# Patient Record
Sex: MALE | Race: OTHER | NOT HISPANIC OR LATINO | URBAN - METROPOLITAN AREA
[De-identification: names, ages, dates, MRNs, and addresses within clinical notes are randomized per-mention and may not be internally consistent; named-entity substitution may affect disease eponyms.]

---
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
## 2023-01-11 ENCOUNTER — APPOINTMENT (OUTPATIENT)
Dept: PEDIATRIC PULMONARY CYSTIC FIB | Facility: CLINIC | Age: 18
End: 2023-01-11
Payer: COMMERCIAL

## 2023-01-11 VITALS
HEIGHT: 70.87 IN | HEART RATE: 108 BPM | WEIGHT: 202.7 LBS | BODY MASS INDEX: 28.38 KG/M2 | SYSTOLIC BLOOD PRESSURE: 110 MMHG | OXYGEN SATURATION: 98 % | DIASTOLIC BLOOD PRESSURE: 74 MMHG

## 2023-01-11 PROCEDURE — 95012 NITRIC OXIDE EXP GAS DETER: CPT

## 2023-01-11 PROCEDURE — 99214 OFFICE O/P EST MOD 30 MIN: CPT | Mod: 25

## 2023-01-11 NOTE — HISTORY OF PRESENT ILLNESS
[FreeTextEntry1] : This 17-year-old returns for a follow-up visit.  \par He had been taking Arnuity Ellipta 200 mcg a puff, 1 puff daily.  \par \par He was not nasally congested.  He occasionally coughs at night, probably nights that he exercises without remembering to take albuterol prior to activity.    He usually takes albuterol prior to activity and was  tolerating activity well.\par \par He does not snack but eats large meals.  His weight is increased 3 kg since August 2022 with increasing BMI.\par \par He drinks  2 cups of 1% organic milk a day.\par \par Sleep: He was no longer snoring at night.\par \par \par  He had not experienced chest tightness.    \par \par Respiratory allergy panel by the [employer] showed multiple positive reactions.\par He was 3+ to cat, 2+ to dog, 2+ ragweed, borderline Bermuda grass, Louie grass, mouse, maple/box elder. IgE 47. 25 hydroxy vitamin D level 31 ng per mL. Chest x-ray negative.\par \par \par PAST MEDICAL HISTORY:\par History of developing a persistent cough when he would catch a cold. This had been ongoing  between 10 and 14 years of age.  He would have 4-6 flare-ups a year. With flare-ups, he would develop shortness of breath with activity. He developed a persistent cough mid-January 2019 that resolved once he was placed on an inhaled corticosteroid.\par \par Hospitalizations: Never\par \par Emergency room visits: Once at a month of age with fever. He had a sepsis workup performed at that time.\par \par Surgery: He has never been operated on.

## 2023-01-11 NOTE — ASSESSMENT
[FreeTextEntry1] : Impression: Moderate persistent bronchial asthma, allergic rhinitis, borderline vitamin D levels, he is overweight\par Moderate persistent bronchial asthma :  Albuterol is to be taken prior to activity and every 4 hours as needed.  Results of exhaled nitric oxide testing discussed.  Arnuity Ellipta was prescribed 200 mcg a puff, 1 puff daily.  He had received the Covid vaccine.  He has decided against the influenza vaccine.  If he continues to do well, I plan to decrease the strength of his Arnuity 200 mcg a puff.\par Allergic rhinitis: Environmental allergen control measures have been suggested.   Claritin is to be used as needed.\par \par Borderline vitamin D levels: He is drinking 2 cups of milk a day.   \par .\par Mildly overweight: Food choices were discussed with both Mikhail and mother.  I encouraged him to continue to increase his activity level and decrease portion size.\par Over 50% of time was spent in counseling. I asked mother to bring him back for a follow-up visit in 4 months.

## 2023-01-11 NOTE — PHYSICAL EXAM
[Well Nourished] : well nourished [Well Developed] : well developed [Alert] : ~L alert [Active] : active [Tympanic Membranes Clear] : tympanic membranes were clear [No Nasal Drainage] : no nasal drainage [No Polyps] : no polyps [No Sinus Tenderness] : no sinus tenderness [No Oral Pallor] : no oral pallor [No Oral Cyanosis] : no oral cyanosis [Non-Erythematous] : non-erythematous [No Postnasal Drip] : no postnasal drip [Tonsil Size ___] : tonsil size [unfilled] [No Stridor] : no stridor [Absence Of Retractions] : absence of retractions [Symmetric] : symmetric [Good Expansion] : good expansion [No Acc Muscle Use] : no accessory muscle use [Good aeration to bases] : good aeration to bases [Equal Breath Sounds] : equal breath sounds bilaterally [No Crackles] : no crackles [No Rhonchi] : no rhonchi [No Wheezing] : no wheezing [Normal Sinus Rhythm] : normal sinus rhythm [Soft, Non-Tender] : soft, non-tender [No Hepatosplenomegaly] : no hepatosplenomegaly [Non Distended] : was not ~L distended [Abdomen Mass (___ Cm)] : no abdominal mass palpated [Abdomen Hernia] : no hernia was discovered [Full ROM] : full range of motion [No Clubbing] : no clubbing [Capillary Refill < 2 secs] : capillary refill less than two seconds [No Cyanosis] : no cyanosis [No Petechiae] : no petechiae [No Kyphoscoliosis] : no kyphoscoliosis [No Contractures] : no contractures [Abnormal Walk] : normal gait [Alert and  Oriented] : alert and oriented [No Abnormal Focal Findings] : no abnormal focal findings [Normal Muscle Tone And Reflexes] : normal muscle tone and reflexes [No Birth Marks] : no birth marks [No Skin Ulcers] : no skin ulcers [FreeTextEntry1] : Overweight  [FreeTextEntry2] : Allergic shiners [FreeTextEntry4] : Nasal mucous membranes raegan [de-identified] : mild acne face.  Scar dorsum of hand secondary to an injury.

## 2023-01-11 NOTE — CONSULT LETTER
[Dear  ___] : Dear  [unfilled], [Consult Letter:] : I had the pleasure of evaluating your patient, [unfilled]. [Please see my note below.] : Please see my note below. [Consult Closing:] : Thank you very much for allowing me to participate in the care of this patient.  If you have any questions, please do not hesitate to contact me. [Sincerely,] : Sincerely, [FreeTextEntry3] : Neville Ortega MD\par Pediatric Pulmonology and Sleep Medicine\par Director Pediatric Asthma Center\par , Pediatric Sleep Disorders,\par  of Pediatrics, [facility] at [facility],\par [address] [place] [address]\[place] [address]\par (P)[phone]\par (P) [phone]\par (F) [phone] \par \par

## 2023-01-11 NOTE — REVIEW OF SYSTEMS
[Nl] : Endocrine [Cough] : cough [Rash] : rash [Allergy Shiners] : allergy shiners [Frequent URIs] : no frequent upper respiratory infections [Snoring] : no snoring [Apnea] : no apnea [Restlessness] : no restlessness [Daytime Sleepiness] : no daytime sleepiness [Daytime Hyperactivity] : no daytime hyperactivity [Voice Changes] : no voice changes [Frequent Croup] : no frequent croup [Chronic Hoarseness] : no chronic hoarseness [Rhinorrhea] : no rhinorrhea [Nasal Congestion] : no nasal congestion [Postnasl Drip] : no postnasal drip [Epistaxis] : no epistaxis [Tinnitus] : no tinnitus [Tachypnea] : not tachypneic [Wheezing] : no wheezing [Shortness of Breath] : no shortness of breath [Bronchitis] : no bronchitis [Pneumonia] : no pneumonia [Hemoptysis] : no hemoptysis [Nocturia] : no nocturia [Urgency] : no feelings of urinary urgency [Frequency] : no urinary frequency [Dysuria] : no dysuria [Birth Marks] : no birth marks [Eczema] : no ezcema [Urticaria] : no urticaria [Laryngeal Edema] : no laryngeal edema [Immunocompromised] : not immunocompromised [de-identified] : acne face

## 2023-03-30 ENCOUNTER — APPOINTMENT (OUTPATIENT)
Dept: ORTHOPEDIC SURGERY | Facility: CLINIC | Age: 18
End: 2023-03-30
Payer: COMMERCIAL

## 2023-03-30 VITALS — WEIGHT: 202 LBS | HEIGHT: 70 IN | BODY MASS INDEX: 28.92 KG/M2

## 2023-03-30 PROCEDURE — 99202 OFFICE O/P NEW SF 15 MIN: CPT

## 2023-03-30 NOTE — DATA REVIEWED
[FreeTextEntry1] : I reviewed his radiographs from initial film which shows a proximal pole of scaphoid fracture that does appear to be nondisplaced.  MRI confirms this.  Then more recent radiographs show not much of a cystic change there could be some displacement of the fracture proximal pole

## 2023-03-30 NOTE — PHYSICAL EXAM
[de-identified] : Patient is tender to palpation around the wrist area.  The radial side of the wrist he has normal sensation normal capillary refill no erythema ecchymoses or abrasions.

## 2023-03-30 NOTE — HISTORY OF PRESENT ILLNESS
[de-identified] : 18-year-old male fell from a height resulting in injury to his left wrist.  His injury occurred on January 4.  He did not seek care at that time eventually got radiographs done about a month later of his left wrist there was a question of a scaphoid injury at that time MRI was ordered was done about a month later he had recent radiographs done just the other day and he comes in for the evaluation today.  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

## 2023-03-30 NOTE — ASSESSMENT
[FreeTextEntry1] : Patient has a left wrist proximal pole scaphoid fracture.  3 months since injury.  CAT scan is necessary to evaluate for the possibility of nonunion displacement and healing he is going to get this study done.  It is important that this study get done quickly.  Once he gets the study done we will contact the office for evaluation stability of surgical intervention with bone graft was discussed

## 2023-04-27 ENCOUNTER — NON-APPOINTMENT (OUTPATIENT)
Age: 18
End: 2023-04-27

## 2023-05-05 ENCOUNTER — OUTPATIENT (OUTPATIENT)
Dept: OUTPATIENT SERVICES | Facility: HOSPITAL | Age: 18
LOS: 1 days | End: 2023-05-05
Payer: COMMERCIAL

## 2023-05-05 VITALS
OXYGEN SATURATION: 98 % | HEART RATE: 70 BPM | TEMPERATURE: 98 F | WEIGHT: 205.03 LBS | RESPIRATION RATE: 16 BRPM | SYSTOLIC BLOOD PRESSURE: 120 MMHG | DIASTOLIC BLOOD PRESSURE: 84 MMHG | HEIGHT: 72 IN

## 2023-05-05 DIAGNOSIS — S62.032K: ICD-10-CM

## 2023-05-05 DIAGNOSIS — Z01.818 ENCOUNTER FOR OTHER PREPROCEDURAL EXAMINATION: ICD-10-CM

## 2023-05-05 LAB
ALBUMIN SERPL ELPH-MCNC: 4.9 G/DL — SIGNIFICANT CHANGE UP (ref 3.5–5.2)
ALP SERPL-CCNC: 73 U/L — SIGNIFICANT CHANGE UP (ref 30–115)
ALT FLD-CCNC: 22 U/L — SIGNIFICANT CHANGE UP (ref 13–38)
ANION GAP SERPL CALC-SCNC: 10 MMOL/L — SIGNIFICANT CHANGE UP (ref 7–14)
APPEARANCE UR: CLEAR — SIGNIFICANT CHANGE UP
AST SERPL-CCNC: 23 U/L — SIGNIFICANT CHANGE UP (ref 13–38)
BASOPHILS # BLD AUTO: 0.03 K/UL — SIGNIFICANT CHANGE UP (ref 0–0.2)
BASOPHILS NFR BLD AUTO: 0.4 % — SIGNIFICANT CHANGE UP (ref 0–1)
BILIRUB SERPL-MCNC: 1.6 MG/DL — HIGH (ref 0.2–1.2)
BILIRUB UR-MCNC: NEGATIVE — SIGNIFICANT CHANGE UP
BUN SERPL-MCNC: 16 MG/DL — SIGNIFICANT CHANGE UP (ref 10–20)
CALCIUM SERPL-MCNC: 9 MG/DL — SIGNIFICANT CHANGE UP (ref 8.4–10.5)
CHLORIDE SERPL-SCNC: 102 MMOL/L — SIGNIFICANT CHANGE UP (ref 98–110)
CO2 SERPL-SCNC: 29 MMOL/L — SIGNIFICANT CHANGE UP (ref 17–32)
COLOR SPEC: COLORLESS — SIGNIFICANT CHANGE UP
CREAT SERPL-MCNC: 1.2 MG/DL — SIGNIFICANT CHANGE UP (ref 0.7–1.5)
DIFF PNL FLD: NEGATIVE — SIGNIFICANT CHANGE UP
EGFR: 89 ML/MIN/1.73M2 — SIGNIFICANT CHANGE UP
EOSINOPHIL # BLD AUTO: 0.12 K/UL — SIGNIFICANT CHANGE UP (ref 0–0.7)
EOSINOPHIL NFR BLD AUTO: 1.7 % — SIGNIFICANT CHANGE UP (ref 0–8)
GLUCOSE SERPL-MCNC: 83 MG/DL — SIGNIFICANT CHANGE UP (ref 70–99)
GLUCOSE UR QL: NEGATIVE — SIGNIFICANT CHANGE UP
HCT VFR BLD CALC: 45 % — SIGNIFICANT CHANGE UP (ref 42–52)
HGB BLD-MCNC: 15.1 G/DL — SIGNIFICANT CHANGE UP (ref 14–18)
IMM GRANULOCYTES NFR BLD AUTO: 0.3 % — SIGNIFICANT CHANGE UP (ref 0.1–0.3)
KETONES UR-MCNC: NEGATIVE — SIGNIFICANT CHANGE UP
LEUKOCYTE ESTERASE UR-ACNC: NEGATIVE — SIGNIFICANT CHANGE UP
LYMPHOCYTES # BLD AUTO: 2.01 K/UL — SIGNIFICANT CHANGE UP (ref 1.2–3.4)
LYMPHOCYTES # BLD AUTO: 29.1 % — SIGNIFICANT CHANGE UP (ref 20.5–51.1)
MCHC RBC-ENTMCNC: 30.3 PG — SIGNIFICANT CHANGE UP (ref 27–31)
MCHC RBC-ENTMCNC: 33.6 G/DL — SIGNIFICANT CHANGE UP (ref 32–37)
MCV RBC AUTO: 90.4 FL — SIGNIFICANT CHANGE UP (ref 80–94)
MONOCYTES # BLD AUTO: 0.65 K/UL — HIGH (ref 0.1–0.6)
MONOCYTES NFR BLD AUTO: 9.4 % — HIGH (ref 1.7–9.3)
NEUTROPHILS # BLD AUTO: 4.08 K/UL — SIGNIFICANT CHANGE UP (ref 1.4–6.5)
NEUTROPHILS NFR BLD AUTO: 59.1 % — SIGNIFICANT CHANGE UP (ref 42.2–75.2)
NITRITE UR-MCNC: NEGATIVE — SIGNIFICANT CHANGE UP
NRBC # BLD: 0 /100 WBCS — SIGNIFICANT CHANGE UP (ref 0–0)
PH UR: 6.5 — SIGNIFICANT CHANGE UP (ref 5–8)
PLATELET # BLD AUTO: 305 K/UL — SIGNIFICANT CHANGE UP (ref 130–400)
PMV BLD: 8.8 FL — SIGNIFICANT CHANGE UP (ref 7.4–10.4)
POTASSIUM SERPL-MCNC: 4.3 MMOL/L — SIGNIFICANT CHANGE UP (ref 3.5–5)
POTASSIUM SERPL-SCNC: 4.3 MMOL/L — SIGNIFICANT CHANGE UP (ref 3.5–5)
PROT SERPL-MCNC: 6.9 G/DL — SIGNIFICANT CHANGE UP (ref 6–8)
PROT UR-MCNC: NEGATIVE — SIGNIFICANT CHANGE UP
RBC # BLD: 4.98 M/UL — SIGNIFICANT CHANGE UP (ref 4.7–6.1)
RBC # FLD: 11.8 % — SIGNIFICANT CHANGE UP (ref 11.5–14.5)
SODIUM SERPL-SCNC: 141 MMOL/L — SIGNIFICANT CHANGE UP (ref 135–146)
SP GR SPEC: 1.01 — LOW (ref 1.01–1.03)
UROBILINOGEN FLD QL: SIGNIFICANT CHANGE UP
WBC # BLD: 6.91 K/UL — SIGNIFICANT CHANGE UP (ref 4.8–10.8)
WBC # FLD AUTO: 6.91 K/UL — SIGNIFICANT CHANGE UP (ref 4.8–10.8)

## 2023-05-05 PROCEDURE — 36415 COLL VENOUS BLD VENIPUNCTURE: CPT

## 2023-05-05 PROCEDURE — 80053 COMPREHEN METABOLIC PANEL: CPT

## 2023-05-05 PROCEDURE — 81003 URINALYSIS AUTO W/O SCOPE: CPT

## 2023-05-05 PROCEDURE — 99214 OFFICE O/P EST MOD 30 MIN: CPT | Mod: 25

## 2023-05-05 PROCEDURE — 85025 COMPLETE CBC W/AUTO DIFF WBC: CPT

## 2023-05-05 NOTE — H&P PST ADULT - NSANTHOSAYNRD_GEN_A_CORE
No. LYNNE screening performed.  STOP BANG Legend: 0-2 = LOW Risk; 3-4 = INTERMEDIATE Risk; 5-8 = HIGH Risk

## 2023-05-05 NOTE — H&P PST ADULT - HISTORY OF PRESENT ILLNESS
18yr old male states fell off his truck at home in 1/2023 and " I broke the fall with my LT hand and broke my scaphoid" -reports inability to lift wts and do exercises and pain with certain movements -is now scheduled for OPEN REDUCTION INTERNAL FIXATION LEFT SCAPHOID. Denies COVID S/S. Recd 2 doses vaccine. Verbalized understanding of COVID prevention measures. Exercise radha 3-4 FOS. Exercise induced asthma- triggers - intense cardio -last use Albuterol 1/2023  Anesthesia Alert  NO--Difficult Airway  NO--History of neck surgery or radiation  NO--Limited ROM of neck  NO--History of Malignant hyperthermia  NO--Personal or family history of Pseudocholinesterase deficiency  NO--Prior Anesthesia Complication  NO--Latex Allergy  NO--Loose teeth  NO--History of Rheumatoid Arthritis  NO--LYNNE  No Bleeding risk  NO--Other_____

## 2023-05-06 DIAGNOSIS — S62.032K: ICD-10-CM

## 2023-05-06 DIAGNOSIS — Z01.818 ENCOUNTER FOR OTHER PREPROCEDURAL EXAMINATION: ICD-10-CM

## 2023-05-09 ENCOUNTER — APPOINTMENT (OUTPATIENT)
Dept: ORTHOPEDIC SURGERY | Facility: CLINIC | Age: 18
End: 2023-05-09

## 2023-05-09 NOTE — ASU PATIENT PROFILE, ADULT - FALL HARM RISK - UNIVERSAL INTERVENTIONS
Bed in lowest position, wheels locked, appropriate side rails in place/Call bell, personal items and telephone in reach/Instruct patient to call for assistance before getting out of bed or chair/Non-slip footwear when patient is out of bed/[place] to call system/Physically safe environment - no spills, clutter or unnecessary equipment/Purposeful Proactive Rounding/Room/bathroom lighting operational, light cord in reach

## 2023-05-09 NOTE — ASU PATIENT PROFILE, ADULT - NSSUBSTANCEUSE_GEN_ALL_CORE_SD
[de-identified] : Patient is well nourished, well-developed, in no acute distress, with appropriate mood and affect. The patient is oriented to time, place, and person. Respirations are even and unlabored. Examination reveals satisfactory wound healing. No surrounding erythema. Motion is good and relatively pain free. Leg lengths are acceptable.\par \par  [de-identified] : AP pelvis, AP hip, and lateral x-rays of the left hip were ordered and obtained in the office and demonstrate satisfactory position and alignment of the components are present. No signs of loosening are seen. never used

## 2023-05-10 ENCOUNTER — APPOINTMENT (OUTPATIENT)
Dept: ORTHOPEDIC SURGERY | Facility: AMBULATORY SURGERY CENTER | Age: 18
End: 2023-05-10

## 2023-05-10 ENCOUNTER — TRANSCRIPTION ENCOUNTER (OUTPATIENT)
Age: 18
End: 2023-05-10

## 2023-05-10 ENCOUNTER — OUTPATIENT (OUTPATIENT)
Dept: INPATIENT UNIT | Facility: HOSPITAL | Age: 18
LOS: 1 days | Discharge: HOME | End: 2023-05-10
Payer: COMMERCIAL

## 2023-05-10 VITALS
TEMPERATURE: 98 F | RESPIRATION RATE: 18 BRPM | WEIGHT: 205.03 LBS | HEART RATE: 62 BPM | OXYGEN SATURATION: 100 % | SYSTOLIC BLOOD PRESSURE: 116 MMHG | DIASTOLIC BLOOD PRESSURE: 57 MMHG | HEIGHT: 72 IN

## 2023-05-10 VITALS
SYSTOLIC BLOOD PRESSURE: 119 MMHG | TEMPERATURE: 98 F | RESPIRATION RATE: 12 BRPM | HEART RATE: 66 BPM | DIASTOLIC BLOOD PRESSURE: 55 MMHG | OXYGEN SATURATION: 95 %

## 2023-05-10 DIAGNOSIS — S62.032K: ICD-10-CM

## 2023-05-10 PROCEDURE — 25628 OPTX CARPL SCPHD FX INT FIXJ: CPT | Mod: LT

## 2023-05-10 PROCEDURE — C1713: CPT

## 2023-05-10 RX ORDER — ONDANSETRON 8 MG/1
4 TABLET, FILM COATED ORAL ONCE
Refills: 0 | Status: DISCONTINUED | OUTPATIENT
Start: 2023-05-10 | End: 2023-05-10

## 2023-05-10 RX ORDER — IBUPROFEN 200 MG
1 TABLET ORAL
Qty: 20 | Refills: 0
Start: 2023-05-10

## 2023-05-10 RX ORDER — FLUTICASONE PROPIONATE 220 MCG
1 AEROSOL WITH ADAPTER (GRAM) INHALATION
Refills: 0 | DISCHARGE

## 2023-05-10 RX ORDER — OXYCODONE AND ACETAMINOPHEN 5; 325 MG/1; MG/1
1 TABLET ORAL
Qty: 15 | Refills: 0
Start: 2023-05-10 | End: 2023-05-14

## 2023-05-10 RX ORDER — SODIUM CHLORIDE 9 MG/ML
1000 INJECTION, SOLUTION INTRAVENOUS
Refills: 0 | Status: DISCONTINUED | OUTPATIENT
Start: 2023-05-10 | End: 2023-05-10

## 2023-05-10 RX ORDER — OXYCODONE HYDROCHLORIDE 5 MG/1
5 TABLET ORAL ONCE
Refills: 0 | Status: DISCONTINUED | OUTPATIENT
Start: 2023-05-10 | End: 2023-05-10

## 2023-05-10 RX ORDER — HYDROMORPHONE HYDROCHLORIDE 2 MG/ML
0.5 INJECTION INTRAMUSCULAR; INTRAVENOUS; SUBCUTANEOUS
Refills: 0 | Status: DISCONTINUED | OUTPATIENT
Start: 2023-05-10 | End: 2023-05-10

## 2023-05-10 NOTE — BRIEF OPERATIVE NOTE - NSICDXBRIEFPOSTOP_GEN_ALL_CORE_FT
POST-OP DIAGNOSIS:  Nondisplaced fracture of proximal third of scaphoid bone of left wrist 10-May-2023 10:27:22  Hardy Abarca

## 2023-05-10 NOTE — PRE-ANESTHESIA EVALUATION ADULT - NSANTHADDINFOFT_GEN_ALL_CORE
Discussed benefits of peripheral nerve block including block failure, bleeding, infection and nerve damage.  Patient expressed understanding of these risks, signed informed consent and wishes to proceed with block.  primary regional anesthesia with sedation for patient comfort. back up general anesthesia.

## 2023-05-10 NOTE — BRIEF OPERATIVE NOTE - NSICDXBRIEFPREOP_GEN_ALL_CORE_FT
PRE-OP DIAGNOSIS:  Nondisplaced fracture of proximal third of scaphoid bone of left wrist 10-May-2023 10:25:36  Hardy Abarca

## 2023-05-15 DIAGNOSIS — J45.909 UNSPECIFIED ASTHMA, UNCOMPLICATED: ICD-10-CM

## 2023-05-15 DIAGNOSIS — S62.035A: ICD-10-CM
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
## 2023-05-18 ENCOUNTER — APPOINTMENT (OUTPATIENT)
Dept: ORTHOPEDIC SURGERY | Facility: CLINIC | Age: 18
End: 2023-05-18
Payer: COMMERCIAL

## 2023-05-18 PROBLEM — J45.909 UNSPECIFIED ASTHMA, UNCOMPLICATED: Chronic | Status: ACTIVE | Noted: 2023-05-05

## 2023-05-18 PROBLEM — Z86.19 PERSONAL HISTORY OF OTHER INFECTIOUS AND PARASITIC DISEASES: Chronic | Status: ACTIVE | Noted: 2023-05-05

## 2023-05-18 PROCEDURE — 29075 APPL CST ELBW FNGR SHORT ARM: CPT

## 2023-05-18 PROCEDURE — 99024 POSTOP FOLLOW-UP VISIT: CPT

## 2023-05-18 PROCEDURE — 73110 X-RAY EXAM OF WRIST: CPT | Mod: LT

## 2023-05-18 NOTE — PHYSICAL EXAM
[de-identified] : Postop bandages were removed.  On examination of his left wrist there is no swelling or ecchymosis.  The incision is healing well, there is no surrounding erythema or drainage from the wound.  He has good range of motion of the hand and wrist, sensation is intact throughout, 2+ radial pulse.\par \par X-rays taken in the office today of the left wrist show good alignment of the fracture, hardware is in place.

## 2023-05-18 NOTE — HISTORY OF PRESENT ILLNESS
[de-identified] : 18-year-old male is here today for his first postop appointment.  He is status post left scaphoid ORIF 5/10/2023 with Dr. Abarca.  He is doing well.

## 2023-05-25 ENCOUNTER — APPOINTMENT (OUTPATIENT)
Dept: PEDIATRIC PULMONARY CYSTIC FIB | Facility: CLINIC | Age: 18
End: 2023-05-25
Payer: COMMERCIAL

## 2023-05-25 VITALS
WEIGHT: 199.5 LBS | DIASTOLIC BLOOD PRESSURE: 74 MMHG | SYSTOLIC BLOOD PRESSURE: 118 MMHG | BODY MASS INDEX: 27.62 KG/M2 | OXYGEN SATURATION: 98 % | HEART RATE: 121 BPM | HEIGHT: 71.26 IN

## 2023-05-25 PROCEDURE — 94010 BREATHING CAPACITY TEST: CPT

## 2023-05-25 PROCEDURE — 95012 NITRIC OXIDE EXP GAS DETER: CPT

## 2023-05-25 PROCEDURE — 99214 OFFICE O/P EST MOD 30 MIN: CPT | Mod: 25

## 2023-05-25 NOTE — PHYSICAL EXAM
[Well Nourished] : well nourished [Well Developed] : well developed [Alert] : ~L alert [Active] : active [Tympanic Membranes Clear] : tympanic membranes were clear [No Nasal Drainage] : no nasal drainage [No Polyps] : no polyps [No Sinus Tenderness] : no sinus tenderness [No Oral Pallor] : no oral pallor [No Oral Cyanosis] : no oral cyanosis [Non-Erythematous] : non-erythematous [No Postnasal Drip] : no postnasal drip [Tonsil Size ___] : tonsil size [unfilled] [No Stridor] : no stridor [Absence Of Retractions] : absence of retractions [Symmetric] : symmetric [Good Expansion] : good expansion [No Acc Muscle Use] : no accessory muscle use [Good aeration to bases] : good aeration to bases [Equal Breath Sounds] : equal breath sounds bilaterally [No Crackles] : no crackles [No Rhonchi] : no rhonchi [No Wheezing] : no wheezing [Normal Sinus Rhythm] : normal sinus rhythm [Soft, Non-Tender] : soft, non-tender [No Hepatosplenomegaly] : no hepatosplenomegaly [Non Distended] : was not ~L distended [Abdomen Mass (___ Cm)] : no abdominal mass palpated [Abdomen Hernia] : no hernia was discovered [Full ROM] : full range of motion [No Clubbing] : no clubbing [Capillary Refill < 2 secs] : capillary refill less than two seconds [No Cyanosis] : no cyanosis [No Petechiae] : no petechiae [No Kyphoscoliosis] : no kyphoscoliosis [No Contractures] : no contractures [Abnormal Walk] : normal gait [Alert and  Oriented] : alert and oriented [No Abnormal Focal Findings] : no abnormal focal findings [Normal Muscle Tone And Reflexes] : normal muscle tone and reflexes [No Birth Marks] : no birth marks [No Skin Ulcers] : no skin ulcers [FreeTextEntry1] : Overweight  [FreeTextEntry2] : Allergic shiners [FreeTextEntry4] : Nasal mucous membranes raegan [de-identified] : Cast left arm. [de-identified] : mild acne face.  Scar dorsum of right hand secondary to an injury.

## 2023-05-25 NOTE — REVIEW OF SYSTEMS
[Nl] : Endocrine [Cough] : cough [Rash] : rash [Allergy Shiners] : allergy shiners [Frequent URIs] : no frequent upper respiratory infections [Snoring] : no snoring [Apnea] : no apnea [Restlessness] : no restlessness [Daytime Sleepiness] : no daytime sleepiness [Daytime Hyperactivity] : no daytime hyperactivity [Voice Changes] : no voice changes [Frequent Croup] : no frequent croup [Chronic Hoarseness] : no chronic hoarseness [Rhinorrhea] : no rhinorrhea [Nasal Congestion] : no nasal congestion [Postnasl Drip] : no postnasal drip [Epistaxis] : no epistaxis [Tinnitus] : no tinnitus [Tachypnea] : not tachypneic [Wheezing] : no wheezing [Shortness of Breath] : no shortness of breath [Bronchitis] : no bronchitis [Pneumonia] : no pneumonia [Hemoptysis] : no hemoptysis [Nocturia] : no nocturia [Urgency] : no feelings of urinary urgency [Frequency] : no urinary frequency [Dysuria] : no dysuria [Joint Pains] : no joint pain [Joint Swelling] : no joint swelling [Raynaud's Phenomenon] : no raynaud's phenomenon [Rib Cage Abnormalities] : no rib cage abnormalities [Myalgia] : no myalgia [Trauma] : trauma [Fracture] : fracture [Clubbing] : no clubbing [Back Pain] : no back pain [Birth Marks] : no birth marks [Eczema] : no ezcema [Urticaria] : no urticaria [Laryngeal Edema] : no laryngeal edema [Immunocompromised] : not immunocompromised [de-identified] : acne face

## 2023-05-25 NOTE — IMPRESSION
[Spirometry] : Spirometry [Normal Spirometry] : spirometry normal [FreeTextEntry1] : Spirometry normal with an FEV1 by FVC of 99% and FEF 25 to 75% 136% predicted.  Exhaled nitric oxide 11.

## 2023-05-25 NOTE — ASSESSMENT
[FreeTextEntry1] : Impression: Moderate persistent bronchial asthma, allergic rhinitis, borderline vitamin D levels, he is overweight\par Moderate persistent bronchial asthma : Results of exhaled nitric oxide testing and spirometry discussed.  Albuterol is to be taken prior to activity and every 4 hours as needed.   Arnuity Ellipta was prescribed 200 mcg a puff, 1 puff daily.  He had received the Covid vaccine.  As he does well in the fall, he is to decrease to Arnuity Ellipta 100 mcg a puff 1 puff daily in September.\par Allergic rhinitis: Environmental allergen control measures have been suggested.   Claritin is to be used as needed.\par \par Borderline vitamin D levels: Encouraged him to consistently drink 2 cups of milk a day.     \par .\par Mildly overweight:  I encouraged him to continue to increase his activity level and decrease portion size.\par Over 50% of time was spent in counseling. I asked mother to bring him back for a follow-up visit in 6 months.\par \par Dictation generated through [facility]. Note not proofed and edited.\par

## 2023-05-25 NOTE — HISTORY OF PRESENT ILLNESS
[FreeTextEntry1] : Irina 89-year-old returns for a follow-up visit.  \par He had been taking Arnuity Ellipta 200 mcg a puff, 1 puff daily.  \par \par He had had cough with increased sputum sputum of 2 days duration.  Taking albuterol.\par He works in a go environment in the summer and tends to be symptomatic during this period.\par He had surgery due to fracture of the left scaphoid.  The injury occurred in January 2023.  Surgery was performed May 2023.\par \par He was not nasally congested.  He does not cough at night. .    He usually takes albuterol prior to activity and was  tolerating activity well.\par \par He does not snack but eats large meals.  \par He drinks 1-  2 cups of 1% organic milk a day.\par \par Sleep: He was no longer snoring at night.\par \par \par  He had not experienced chest tightness.    \par \par Respiratory allergy panel by the [employer] showed multiple positive reactions.\par He was 3+ to cat, 2+ to dog, 2+ ragweed, borderline [place] grass, Louie grass, mouse, maple/box elder. IgE 47. 25 hydroxy vitamin D level 31 ng per mL. Chest x-ray negative.\par \par \par PAST MEDICAL HISTORY:\par History of developing a persistent cough when he would catch a cold. This had been ongoing  between 10 and 14 years of age.  He would have 4-6 flare-ups a year. With flare-ups, he would develop shortness of breath with activity. He developed a persistent cough mid-January 2019 that resolved once he was placed on an inhaled corticosteroid.\par \par Hospitalizations: Never\par \par Emergency room visits: Once at a month of age with fever. He had a sepsis workup performed at that time.\par \par Surgery: Surgery to left scaphoid May 2023.

## 2023-05-25 NOTE — CONSULT LETTER
[Dear  ___] : Dear  [unfilled], [Consult Letter:] : I had the pleasure of evaluating your patient, [unfilled]. [Please see my note below.] : Please see my note below. [Consult Closing:] : Thank you very much for allowing me to participate in the care of this patient.  If you have any questions, please do not hesitate to contact me. [Sincerely,] : Sincerely, [FreeTextEntry3] : Neville Ortega MD\par Pediatric Pulmonology and Sleep Medicine\par Director [facility]\par , Pediatric Sleep Disorders,\par  of Pediatrics, [facility],\par [address] [place] [address]\[place] [address]\par (P)[phone]\par (P) [phone]\par (F) [phone] \par \par

## 2023-06-13 ENCOUNTER — APPOINTMENT (OUTPATIENT)
Dept: ORTHOPEDIC SURGERY | Facility: CLINIC | Age: 18
End: 2023-06-13
Payer: COMMERCIAL

## 2023-06-13 VITALS — HEIGHT: 71 IN | BODY MASS INDEX: 27.93 KG/M2 | WEIGHT: 199.5 LBS

## 2023-06-13 PROCEDURE — 99024 POSTOP FOLLOW-UP VISIT: CPT

## 2023-06-13 PROCEDURE — 73110 X-RAY EXAM OF WRIST: CPT | Mod: LT

## 2023-06-13 NOTE — PHYSICAL EXAM
[de-identified] : Patient has no tenderness about the wrist.  Well-healed surgical skin incision.  Some stiffness in the wrist.

## 2023-06-13 NOTE — HISTORY OF PRESENT ILLNESS
[de-identified] : -18 year-old male status post percutaneous screw fixation of a left scaphoid fracture.  Evaluation was casted.

## 2023-06-13 NOTE — ASSESSMENT
[FreeTextEntry1] : Patient a left scaphoid fracture.  We will see us back in a month he will have repeat radiographs at his next visit placed into a removable brace.  Directed him to range of motion exercises.  No pushing in the weight lifting activities

## 2023-06-13 NOTE — DATA REVIEWED
[FreeTextEntry1] : Radiographs 3 views of the left wrist reviewed showing good position alignment and good screw fixation of a scaphoid fracture.

## 2023-07-11 ENCOUNTER — APPOINTMENT (OUTPATIENT)
Dept: ORTHOPEDIC SURGERY | Facility: CLINIC | Age: 18
End: 2023-07-11
Payer: COMMERCIAL

## 2023-07-11 VITALS — WEIGHT: 200 LBS | BODY MASS INDEX: 28 KG/M2 | HEIGHT: 71 IN

## 2023-07-11 PROCEDURE — 99024 POSTOP FOLLOW-UP VISIT: CPT

## 2023-07-11 PROCEDURE — 73110 X-RAY EXAM OF WRIST: CPT | Mod: LT

## 2023-07-11 NOTE — ASSESSMENT
[FreeTextEntry1] : Patient is healed his fracture well we will see us back on an as-needed basis any issues or problems contact the office otherwise he is discharged from care.

## 2023-07-11 NOTE — DATA REVIEWED
[FreeTextEntry1] : Radiographs 4 views of the left wrist are reviewed showing good position alignment and healing consolidation of the left scaphoid fracture

## 2023-07-11 NOTE — HISTORY OF PRESENT ILLNESS
[de-identified] : 18-year-old male status post internal fixation of the left scaphoid injury comes in for evaluation today there is no significant complaints he is functioning reasonably well.  His back from the gym.

## 2023-07-11 NOTE — PHYSICAL EXAM
[de-identified] : Patient is great range of motion to the left wrist he has no significant swelling erythema ecchymoses or abrasions well-healed surgical skin incision.  Excellent flexion just about full extension

## 2023-08-01 ENCOUNTER — RX RENEWAL (OUTPATIENT)
Age: 18
End: 2023-08-01

## 2023-11-22 ENCOUNTER — APPOINTMENT (OUTPATIENT)
Dept: PEDIATRIC PULMONARY CYSTIC FIB | Facility: CLINIC | Age: 18
End: 2023-11-22
Payer: COMMERCIAL

## 2023-11-22 VITALS
HEART RATE: 110 BPM | DIASTOLIC BLOOD PRESSURE: 84 MMHG | SYSTOLIC BLOOD PRESSURE: 120 MMHG | WEIGHT: 219.8 LBS | HEIGHT: 70.75 IN | OXYGEN SATURATION: 98 % | BODY MASS INDEX: 30.77 KG/M2

## 2023-11-22 DIAGNOSIS — E55.9 VITAMIN D DEFICIENCY, UNSPECIFIED: ICD-10-CM

## 2023-11-22 DIAGNOSIS — J30.9 ALLERGIC RHINITIS, UNSPECIFIED: ICD-10-CM

## 2023-11-22 DIAGNOSIS — Z83.79 FAMILY HISTORY OF OTHER DISEASES OF THE DIGESTIVE SYSTEM: ICD-10-CM

## 2023-11-22 DIAGNOSIS — J45.40 MODERATE PERSISTENT ASTHMA, UNCOMPLICATED: ICD-10-CM

## 2023-11-22 DIAGNOSIS — S62.032K DISPLACED FRACTURE OF PROXIMAL THIRD OF NAVICULAR [SCAPHOID] BONE OF LEFT WRIST, SUBSEQUENT ENCOUNTER FOR FRACTURE WITH NONUNION: ICD-10-CM

## 2023-11-22 DIAGNOSIS — L70.9 ACNE, UNSPECIFIED: ICD-10-CM

## 2023-11-22 DIAGNOSIS — E66.3 OVERWEIGHT: ICD-10-CM

## 2023-11-22 DIAGNOSIS — J45.30 MILD PERSISTENT ASTHMA, UNCOMPLICATED: ICD-10-CM

## 2023-11-22 DIAGNOSIS — Z82.49 FAMILY HISTORY OF ISCHEMIC HEART DISEASE AND OTHER DISEASES OF THE CIRCULATORY SYSTEM: ICD-10-CM

## 2023-11-22 PROCEDURE — 94010 BREATHING CAPACITY TEST: CPT

## 2023-11-22 PROCEDURE — 95012 NITRIC OXIDE EXP GAS DETER: CPT

## 2023-11-22 PROCEDURE — 99214 OFFICE O/P EST MOD 30 MIN: CPT | Mod: 25

## 2023-11-22 RX ORDER — FLUTICASONE FUROATE 200 UG/1
200 POWDER RESPIRATORY (INHALATION) DAILY
Qty: 3 | Refills: 0 | Status: DISCONTINUED | COMMUNITY
Start: 2021-07-15 | End: 2023-11-22

## 2023-11-22 RX ORDER — FLUTICASONE FUROATE 100 UG/1
100 POWDER RESPIRATORY (INHALATION) DAILY
Qty: 3 | Refills: 1 | Status: ACTIVE | COMMUNITY
Start: 2023-11-22 | End: 1900-01-01

## 2023-11-22 RX ORDER — ALBUTEROL SULFATE 90 UG/1
108 (90 BASE) INHALANT RESPIRATORY (INHALATION)
Qty: 2 | Refills: 0 | Status: ACTIVE | COMMUNITY
Start: 2021-07-15

## 2023-11-22 RX ORDER — LORATADINE 5 MG/5 ML
10 SOLUTION, ORAL ORAL
Qty: 30 | Refills: 2 | Status: ACTIVE | COMMUNITY
Start: 2021-07-15
